# Patient Record
Sex: MALE | Employment: OTHER | URBAN - METROPOLITAN AREA
[De-identification: names, ages, dates, MRNs, and addresses within clinical notes are randomized per-mention and may not be internally consistent; named-entity substitution may affect disease eponyms.]

---

## 2024-07-25 ENCOUNTER — OFFICE VISIT (OUTPATIENT)
Dept: ENT CLINIC | Age: 74
End: 2024-07-25
Payer: OTHER GOVERNMENT

## 2024-07-25 VITALS
DIASTOLIC BLOOD PRESSURE: 76 MMHG | BODY MASS INDEX: 34.56 KG/M2 | SYSTOLIC BLOOD PRESSURE: 128 MMHG | HEIGHT: 68 IN | WEIGHT: 228 LBS

## 2024-07-25 DIAGNOSIS — R49.0 DYSPHONIA: Primary | ICD-10-CM

## 2024-07-25 PROCEDURE — 99204 OFFICE O/P NEW MOD 45 MIN: CPT | Performed by: OTOLARYNGOLOGY

## 2024-07-25 PROCEDURE — 31575 DIAGNOSTIC LARYNGOSCOPY: CPT | Performed by: OTOLARYNGOLOGY

## 2024-07-25 PROCEDURE — 1123F ACP DISCUSS/DSCN MKR DOCD: CPT | Performed by: OTOLARYNGOLOGY

## 2024-07-25 RX ORDER — METOPROLOL TARTRATE 50 MG/1
50 TABLET, FILM COATED ORAL DAILY
COMMUNITY

## 2024-07-25 RX ORDER — LANSOPRAZOLE 30 MG/1
30 CAPSULE, DELAYED RELEASE ORAL DAILY
COMMUNITY

## 2024-07-25 RX ORDER — MUPIROCIN CALCIUM 20 MG/G
CREAM TOPICAL 3 TIMES DAILY
COMMUNITY

## 2024-07-25 RX ORDER — HYDROCHLOROTHIAZIDE 12.5 MG/1
12.5 CAPSULE, GELATIN COATED ORAL
COMMUNITY

## 2024-07-25 RX ORDER — CLONIDINE HYDROCHLORIDE 0.1 MG/1
0.1 TABLET ORAL 2 TIMES DAILY PRN
COMMUNITY
Start: 2023-08-10 | End: 2024-08-09

## 2024-07-25 RX ORDER — VALSARTAN 160 MG/1
160 TABLET ORAL DAILY
COMMUNITY
Start: 2024-05-02 | End: 2025-05-02

## 2024-07-25 RX ORDER — ASPIRIN 81 MG/1
81 TABLET ORAL DAILY
COMMUNITY

## 2024-07-25 RX ORDER — KETOCONAZOLE 20 MG/G
CREAM TOPICAL DAILY
COMMUNITY

## 2024-07-25 RX ORDER — AMMONIUM LACTATE 12 G/100G
LOTION TOPICAL PRN
COMMUNITY

## 2024-07-25 RX ORDER — OMEPRAZOLE 40 MG/1
40 CAPSULE, DELAYED RELEASE ORAL DAILY
COMMUNITY
Start: 2023-11-03 | End: 2024-11-02

## 2024-07-25 RX ORDER — FAMOTIDINE 20 MG/1
20 TABLET, FILM COATED ORAL NIGHTLY
COMMUNITY
Start: 2023-11-03 | End: 2024-11-02

## 2024-07-25 RX ORDER — PSEUDOEPHEDRINE HCL 30 MG
100 TABLET ORAL DAILY PRN
COMMUNITY

## 2024-07-25 RX ORDER — LOSARTAN POTASSIUM 100 MG/1
100 TABLET ORAL
COMMUNITY

## 2024-07-25 RX ORDER — HYDROCHLOROTHIAZIDE 25 MG/1
25 TABLET ORAL DAILY
COMMUNITY
Start: 2024-05-02 | End: 2025-05-02

## 2024-07-25 RX ORDER — METFORMIN HYDROCHLORIDE 500 MG/1
500 TABLET, EXTENDED RELEASE ORAL
COMMUNITY
Start: 2024-05-03 | End: 2025-05-03

## 2024-07-25 RX ORDER — POTASSIUM CHLORIDE 750 MG/1
10 TABLET, FILM COATED, EXTENDED RELEASE ORAL DAILY
COMMUNITY
Start: 2024-05-02 | End: 2025-05-02

## 2024-07-25 RX ORDER — ATORVASTATIN CALCIUM 80 MG/1
80 TABLET, FILM COATED ORAL DAILY
COMMUNITY
Start: 2024-05-02 | End: 2025-05-02

## 2024-07-25 RX ORDER — CLOTRIMAZOLE 1 %
CREAM (GRAM) TOPICAL DAILY
COMMUNITY

## 2024-07-25 RX ORDER — VALSARTAN 80 MG/1
160 TABLET ORAL DAILY
COMMUNITY

## 2024-07-25 RX ORDER — FINASTERIDE 5 MG/1
5 TABLET, FILM COATED ORAL DAILY
COMMUNITY
Start: 2024-05-02 | End: 2025-05-02

## 2024-07-25 NOTE — PROGRESS NOTES
[place] [address]  P: [phone]          OFFICE VISIT       7/25/2024    Chief Complaint   Patient presents with    New Patient     Dysphonia       HPI:  Tevin Becerra Jr. is a 74 y.o. male seen in consultation today for   Chief Complaint   Patient presents with    New Patient     Dysphonia   .     Patient is a 74-year-old  sent from the [facility] with a chief complaint of aphonia.  States that his problem started in [place].  He is currently under the care of laryngologist Dr. Yeyo King MD and their speech and language pathology team.  He has had follow-up in the [facility].  Presentation to our office today due to not improving following procedure done in February.  Patient uses electrolarynx to assist with communication.      Current Outpatient Medications:     AZELASTINE & FLUTICASONE NA, 2 sprays by Nasal route 2 times daily, Disp: , Rfl:     aspirin 81 MG EC tablet, Take 1 tablet by mouth daily, Disp: , Rfl:     atorvastatin (LIPITOR) 80 MG tablet, Take 1 tablet by mouth daily, Disp: , Rfl:     docusate (COLACE, DULCOLAX) 100 MG CAPS, Take 100 mg by mouth daily as needed, Disp: , Rfl:     famotidine (PEPCID) 20 MG tablet, Take 1 tablet by mouth nightly, Disp: , Rfl:     hydroCHLOROthiazide 12.5 MG capsule, Take 1 capsule by mouth, Disp: , Rfl:     hydroCHLOROthiazide (HYDRODIURIL) 25 MG tablet, Take 1 tablet by mouth daily, Disp: , Rfl:     ketoconazole (NIZORAL) 2 % cream, Apply topically daily, Disp: , Rfl:     lansoprazole (PREVACID) 30 MG delayed release capsule, Take 1 capsule by mouth daily, Disp: , Rfl:     losartan (COZAAR) 100 MG tablet, Take 1 tablet by mouth, Disp: , Rfl:     metFORMIN (GLUCOPHAGE-XR) 500 MG extended release tablet, Take 1 tablet by mouth daily (with breakfast), Disp: , Rfl:     metoprolol tartrate (LOPRESSOR) 50 MG tablet, 1 tablet daily, Disp: , Rfl:     metoprolol tartrate (LOPRESSOR) 25 MG